# Patient Record
Sex: FEMALE | URBAN - METROPOLITAN AREA
[De-identification: names, ages, dates, MRNs, and addresses within clinical notes are randomized per-mention and may not be internally consistent; named-entity substitution may affect disease eponyms.]

---

## 2022-12-06 ENCOUNTER — OFFICE VISIT (OUTPATIENT)
Facility: LOCATION | Age: 52
End: 2022-12-06
Payer: COMMERCIAL

## 2022-12-06 VITALS — HEART RATE: 77 BPM | TEMPERATURE: 98 F

## 2022-12-06 DIAGNOSIS — N63.0 MASS OF BREAST, UNSPECIFIED LATERALITY: Primary | ICD-10-CM

## 2022-12-06 PROCEDURE — 99204 OFFICE O/P NEW MOD 45 MIN: CPT | Performed by: SURGERY

## 2023-07-25 ENCOUNTER — OFFICE VISIT (OUTPATIENT)
Facility: LOCATION | Age: 53
End: 2023-07-25
Payer: COMMERCIAL

## 2023-07-25 DIAGNOSIS — N60.02 CYST (SOLITARY) OF BREAST, LEFT: Primary | ICD-10-CM

## 2023-07-25 PROCEDURE — 99214 OFFICE O/P EST MOD 30 MIN: CPT | Performed by: SURGERY

## 2023-07-25 NOTE — PROGRESS NOTES
[facility]  Progress Note    Kieth Skiff Patient Status:  No patient class for patient encounter    1970 MRN [id number]   Location [facility], [address] [facility] Attending No att. providers found   Hosp Day # 0 PCP No primary care provider on file. Subjective:  Patient returns for examination and evaluation regarding left breast complicated cyst at the 9 o'clock position. Patient denies complaints she denies being able to feel a mass at this location she denies masses elsewhere in her breast.  She denies any change in her family history. Still no family history of breast carcinoma    Objective/Physical Exam:    [unfilled]    General: Alert, orientated x3. Cooperative. No apparent distress. Vital Signs: There were no vitals taken for this visit. HEENT: Exam is unremarkable. Without scleral icterus. Mucous membranes are moist. Oropharynx is clear. Neck: No tenderness to palpitation. No JVD. Supple. Lungs: Clear to auscultation bilaterally. Cardiac: Regular rate and rhythm. No murmur. Abdomen:  Soft, non-distended, non-tender, with no rebound or guarding. No peritoneal signs. No ascites. Liver is within normal limits. Spleen is not palpable   Extremities:  No lower extremity edema noted. Without clubbing or cyanosis. Breast examination demonstrates no masses right or left no axillary lymphadenopathy. Patient examined with [facility]    Labs:             Assessment/Plan:  There is no problem list on file for this patient. Impression: Complicated cyst of the left breast 9 o'clock position patient advised to undergo mammogram and ultrasound [place] [facility] [place] Plan: Mammogram and ultrasound at this time attention 9 o'clock position left breast.  Patient will be converting over to the [employer] system we will put the order in through [employer]. I spent  33  minutes face to face with the patient.  More than 50% of that time was spent counseling the patient and/or on coordination of care. The diagnosis, prognosis, and general treatment was explained to the patient and the family.         DO CATIE Yao General Surgery  7/25/2023  2:42 PM

## 2023-08-09 ENCOUNTER — HOSPITAL ENCOUNTER (OUTPATIENT)
Dept: MAMMOGRAPHY | Facility: HOSPITAL | Age: 53
Discharge: HOME | End: 2023-08-09
Attending: SURGERY
Payer: COMMERCIAL

## 2023-08-09 DIAGNOSIS — N60.02 CYST (SOLITARY) OF BREAST, LEFT: ICD-10-CM

## 2023-08-09 PROCEDURE — 76642 ULTRASOUND BREAST LIMITED: CPT | Performed by: SURGERY

## 2023-08-09 PROCEDURE — 77065 DX MAMMO INCL CAD UNI: CPT | Performed by: SURGERY

## 2023-08-09 PROCEDURE — 77061 BREAST TOMOSYNTHESIS UNI: CPT | Performed by: SURGERY
